# Patient Record
(demographics unavailable — no encounter records)

---

## 2024-10-14 NOTE — ASSESSMENT
[FreeTextEntry1] : Impression:  urianry frequency ED  Plan:  continue tadalafil and solifenacin.  Follow-up one year

## 2024-10-14 NOTE — HISTORY OF PRESENT ILLNESS
[FreeTextEntry1] : On solifenacin.  Has frequency. no dysuria. no hematuria.  no post void fullness. no urgency. no post void dribbling.

## 2024-10-14 NOTE — LETTER BODY
[Dear  ___] : Dear  [unfilled], [Courtesy Letter:] : I had the pleasure of seeing your patient, [unfilled], in my office today. [Please see my note below.] : Please see my note below. [Sincerely,] : Sincerely, [FreeTextEntry3] : Ed  Kevin Pisano MD [facility]  of [facility] [place] and [employer] at [facility]